# Patient Record
Sex: FEMALE | Race: WHITE | NOT HISPANIC OR LATINO | URBAN - NONMETROPOLITAN AREA
[De-identification: names, ages, dates, MRNs, and addresses within clinical notes are randomized per-mention and may not be internally consistent; named-entity substitution may affect disease eponyms.]

---

## 2022-11-21 PROBLEM — Z12.4 SCREENING FOR CERVICAL CANCER: Chronic | Status: CHRONIC | Noted: 2022-11-21

## 2022-11-21 PROBLEM — Z30.41 ENCOUNTER FOR REPEAT PRESCRIPTION OF ORAL CONTRACEPTIVES: Chronic | Status: CHRONIC | Noted: 2022-11-21

## 2022-11-21 PROBLEM — Z12.39 ENCOUNTER FOR SCREENING BREAST EXAMINATION AND DISCUSSION OF BREAST SELF EXAMINATION: Chronic | Status: CHRONIC | Noted: 2022-11-21

## 2023-01-16 ENCOUNTER — OFFICE (OUTPATIENT)
Dept: URBAN - NONMETROPOLITAN AREA CLINIC 1 | Facility: CLINIC | Age: 33
End: 2023-01-16

## 2023-01-16 VITALS
SYSTOLIC BLOOD PRESSURE: 117 MMHG | DIASTOLIC BLOOD PRESSURE: 89 MMHG | WEIGHT: 171 LBS | HEIGHT: 69 IN | HEART RATE: 71 BPM

## 2023-01-16 DIAGNOSIS — K58.1 IRRITABLE BOWEL SYNDROME WITH CONSTIPATION: ICD-10-CM

## 2023-01-16 DIAGNOSIS — K21.9 GASTRO-ESOPHAGEAL REFLUX DISEASE WITHOUT ESOPHAGITIS: ICD-10-CM

## 2023-01-16 PROCEDURE — 99213 OFFICE O/P EST LOW 20 MIN: CPT | Performed by: INTERNAL MEDICINE

## 2023-01-16 RX ORDER — LINACLOTIDE 290 UG/1
CAPSULE, GELATIN COATED ORAL
Qty: 90 | Refills: 0 | Status: COMPLETED
Start: 2023-01-16 | End: 2024-06-12 | Stop reason: SDUPTHER

## 2024-06-12 ENCOUNTER — OFFICE (OUTPATIENT)
Dept: URBAN - NONMETROPOLITAN AREA CLINIC 1 | Facility: CLINIC | Age: 34
End: 2024-06-12

## 2024-06-12 VITALS
DIASTOLIC BLOOD PRESSURE: 82 MMHG | WEIGHT: 195 LBS | HEART RATE: 80 BPM | HEIGHT: 69 IN | SYSTOLIC BLOOD PRESSURE: 124 MMHG

## 2024-06-12 DIAGNOSIS — K58.1 IRRITABLE BOWEL SYNDROME WITH CONSTIPATION: ICD-10-CM

## 2024-06-12 PROCEDURE — 99213 OFFICE O/P EST LOW 20 MIN: CPT | Performed by: NURSE PRACTITIONER

## 2024-06-12 RX ORDER — LINACLOTIDE 290 UG/1
CAPSULE, GELATIN COATED ORAL
Qty: 90 | Refills: 3 | Status: ACTIVE
Start: 2024-06-12

## 2024-06-12 NOTE — SERVICEHPINOTES
patient here today for follow up.  She has IBS-C controlled with Linzess 290mcg po qd.  She is tolerating medication well and with Linzess daily  has 4-5 soft BM daily, denies straining with defecation.  Denies n/v/d, abdominal pain, melena, hematochezia, unintentional weight loss, or change in BM.  Denies any rectal pain.  Denies any issues with gerd now that she has stopped Mounjaro-unable to tolerate the medication.  
br
brPast GI historybr1. Constipation predominant irritable bowel on therapy with LinzessPast GI Procedure historybr1. colonoscopy December 19, 2013 completed by Dr. Reece. Normal colonic mucosa to the cecum. No comment on intubation of the terminal ileum. br2. Reportedly she was H pylori negative on clinic workup prior to colonoscopy.br3. Colonoscopy 09/16/2021- normal terminal ileum. Very mildly edematous colon proximal colon. Random colon biopsies were negative.Family historybrNo family history of gastric or colorectal malignancy. No family history of Crohn's or ulcerative colitis. No family history of autoimmune disorder.Social history -recently got , .

## 2024-06-12 NOTE — SERVICENOTES
refill Linzess 290 mcg take 1 tablet p.o. 30 minutes a.c. breakfast q.d. for IBS - C 
Patient doing well on Linzess as above. Will f/u in one year or sooner if needed.
Colonoscopy UTD see HPI above